# Patient Record
(demographics unavailable — no encounter records)

---

## 2024-10-11 NOTE — HISTORY OF PRESENT ILLNESS
[FreeTextEntry1] : Patient is an 89-year-old male seen today for an initial audiological evaluation. He reports that he has some trouble hearing the television and that people have noted that he speaks loudly. 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 Patient reports a history of various surgeries to the head as well as surgery on his right pinna. Patient denies tinnitus, vertigo, dizziness, otalgia and history of recent ear infections.

## 2024-10-11 NOTE — PROCEDURE
[226 Hz] : 226 Hz [Normal Eardrum Mobility] : consistent with normal eardrum mobility [Type A Tympanogram] : Type A Normal [] : Audiogram: [] : Complete Audiological Evaluation [Good] : good [Insert Ear Phones] : insert ear phones [de-identified] : Hearing within normal limits at 250 Hz sloping to a severe sensorineural hearing loss, bilaterally.  Fair speech recognition scores obtained (77% AD; 68% AS)

## 2024-10-11 NOTE — PROCEDURE
[226 Hz] : 226 Hz [Normal Eardrum Mobility] : consistent with normal eardrum mobility [Type A Tympanogram] : Type A Normal [] : Audiogram: [] : Complete Audiological Evaluation [Good] : good [Insert Ear Phones] : insert ear phones [de-identified] : Hearing within normal limits at 250 Hz sloping to a severe sensorineural hearing loss, bilaterally.  Fair speech recognition scores obtained (77% AD; 68% AS)

## 2024-10-11 NOTE — PLAN
[FreeTextEntry2] : 1. Hearing aid evaluation pending patient interest and motivation. 2. Annual audiological evaluation or sooner if change in hearing noted and/or medically indicated.

## 2024-10-11 NOTE — ASSESSMENT
[FreeTextEntry1] : Reviewed results and recommendations with patient and his wife. Reviewed patient's hearing loss and discussed implications on speech and language understanding. Discussed patient's speech recognition scores and its relation to possible hearing aid outcomes. Briefly discussed how hearing aids work and realistic expectations re: hearing aids. Provided patient with information for our hearing aid dispensary.

## 2025-02-23 NOTE — COUNSELING
[Fall prevention counseling provided] : Fall prevention counseling provided [Adequate lighting] : Adequate lighting [No throw rugs] : No throw rugs [Use proper foot wear] : Use proper foot wear [Use recommended devices] : Use recommended devices [de-identified] : Safety/ Fall precautions  [Other: ____] : [unfilled]

## 2025-02-23 NOTE — COUNSELING
[Fall prevention counseling provided] : Fall prevention counseling provided [Adequate lighting] : Adequate lighting [No throw rugs] : No throw rugs [Use proper foot wear] : Use proper foot wear [Use recommended devices] : Use recommended devices [de-identified] : Safety/ Fall precautions  [Other: ____] : [unfilled]

## 2025-02-23 NOTE — HEALTH RISK ASSESSMENT
[Yes] : Yes [2 - 3 times a week (3 pts)] : 2 - 3  times a week (3 points) [1 or 2 (0 pts)] : 1 or 2 (0 points) [Never (0 pts)] : Never (0 points) [No] : In the past 12 months have you used drugs other than those required for medical reasons? No [No falls in past year] : Patient reported no falls in the past year [0] : 2) Feeling down, depressed, or hopeless: Not at all (0) [Never] : Never [de-identified] : none [de-identified] : Cardiology [Audit-CScore] : 1 [de-identified] : no [de-identified] : healthy [[id number]] : 0

## 2025-02-23 NOTE — PLAN
[FreeTextEntry1] : 89 yr old F/U  HTN- Controlled, Low salt diet , meds  4daily  Insomnia- Ambien most days Discussed risks/ benefits of medication Advised to decrease alcohol which can effect sleeping  Hyperlipidemia- Statin daily, low fats, low carbs Exercise    Labs done in office by MA  Will call with results

## 2025-02-23 NOTE — HISTORY OF PRESENT ILLNESS
[FreeTextEntry1] : F/U Mult medical conditions  [de-identified] : 89 yr old presents for above, feels well overall. Multiple melanomas , sees Derm regular basis. Eats healthy, Limited Physical activity.

## 2025-02-23 NOTE — HISTORY OF PRESENT ILLNESS
[FreeTextEntry1] : F/U Mult medical conditions  [de-identified] : 89 yr old presents for above, feels well overall. Multiple melanomas , sees Derm regular basis. Eats healthy, Limited Physical activity.

## 2025-02-23 NOTE — PHYSICAL EXAM
[No Acute Distress] : no acute distress [Normal Sclera/Conjunctiva] : normal sclera/conjunctiva [Normal] : normal rate, regular rhythm, normal S1 and S2 and no murmur heard [No Edema] : there was no peripheral edema [Soft] : abdomen soft [Normal Anterior Cervical Nodes] : no anterior cervical lymphadenopathy [No CVA Tenderness] : no CVA  tenderness [Coordination Grossly Intact] : coordination grossly intact [No Joint Swelling] : no joint swelling [Speech Grossly Normal] : speech grossly normal [Normal Mood] : the mood was normal [de-identified] : Several layers Scalp removed

## 2025-02-23 NOTE — PHYSICAL EXAM
[No Acute Distress] : no acute distress [Normal Sclera/Conjunctiva] : normal sclera/conjunctiva [Normal] : normal rate, regular rhythm, normal S1 and S2 and no murmur heard [No Edema] : there was no peripheral edema [Soft] : abdomen soft [Normal Anterior Cervical Nodes] : no anterior cervical lymphadenopathy [No CVA Tenderness] : no CVA  tenderness [No Joint Swelling] : no joint swelling [Coordination Grossly Intact] : coordination grossly intact [Speech Grossly Normal] : speech grossly normal [Normal Mood] : the mood was normal [de-identified] : Several layers Scalp removed

## 2025-02-23 NOTE — HEALTH RISK ASSESSMENT
[Yes] : Yes [2 - 3 times a week (3 pts)] : 2 - 3  times a week (3 points) [1 or 2 (0 pts)] : 1 or 2 (0 points) [Never (0 pts)] : Never (0 points) [No] : In the past 12 months have you used drugs other than those required for medical reasons? No [No falls in past year] : Patient reported no falls in the past year [0] : 2) Feeling down, depressed, or hopeless: Not at all (0) [Never] : Never [de-identified] : none [de-identified] : Cardiology [Audit-CScore] : 1 [de-identified] : no [de-identified] : healthy [[id number]] : 0